# Patient Record
Sex: MALE | Race: WHITE
[De-identification: names, ages, dates, MRNs, and addresses within clinical notes are randomized per-mention and may not be internally consistent; named-entity substitution may affect disease eponyms.]

---

## 2020-10-15 ENCOUNTER — HOSPITAL ENCOUNTER (INPATIENT)
Dept: HOSPITAL 96 - M.ERS | Age: 80
LOS: 2 days | Discharge: HOME | DRG: 372 | End: 2020-10-17
Attending: INTERNAL MEDICINE | Admitting: INTERNAL MEDICINE
Payer: MEDICARE

## 2020-10-15 VITALS — DIASTOLIC BLOOD PRESSURE: 71 MMHG | SYSTOLIC BLOOD PRESSURE: 140 MMHG

## 2020-10-15 VITALS — WEIGHT: 173.9 LBS | BODY MASS INDEX: 25.76 KG/M2 | HEIGHT: 69.02 IN

## 2020-10-15 VITALS — SYSTOLIC BLOOD PRESSURE: 136 MMHG | DIASTOLIC BLOOD PRESSURE: 66 MMHG

## 2020-10-15 VITALS — DIASTOLIC BLOOD PRESSURE: 77 MMHG | SYSTOLIC BLOOD PRESSURE: 162 MMHG

## 2020-10-15 VITALS — DIASTOLIC BLOOD PRESSURE: 65 MMHG | SYSTOLIC BLOOD PRESSURE: 136 MMHG

## 2020-10-15 DIAGNOSIS — Z87.891: ICD-10-CM

## 2020-10-15 DIAGNOSIS — Z91.048: ICD-10-CM

## 2020-10-15 DIAGNOSIS — M19.90: ICD-10-CM

## 2020-10-15 DIAGNOSIS — Z79.82: ICD-10-CM

## 2020-10-15 DIAGNOSIS — Z23: ICD-10-CM

## 2020-10-15 DIAGNOSIS — D72.829: ICD-10-CM

## 2020-10-15 DIAGNOSIS — K63.89: ICD-10-CM

## 2020-10-15 DIAGNOSIS — K92.1: ICD-10-CM

## 2020-10-15 DIAGNOSIS — Z90.49: ICD-10-CM

## 2020-10-15 DIAGNOSIS — Z20.828: ICD-10-CM

## 2020-10-15 DIAGNOSIS — A04.72: Primary | ICD-10-CM

## 2020-10-15 DIAGNOSIS — Z79.899: ICD-10-CM

## 2020-10-15 LAB
ABSOLUTE BASOPHILS: 0.2 THOU/UL (ref 0–0.2)
ABSOLUTE EOSINOPHILS: 0.1 THOU/UL (ref 0–0.7)
ABSOLUTE MONOCYTES: 0.8 THOU/UL (ref 0–1.2)
ALBUMIN SERPL-MCNC: 3.9 G/DL (ref 3.4–5)
ALP SERPL-CCNC: 65 U/L (ref 46–116)
ALT SERPL-CCNC: 46 U/L (ref 30–65)
ANION GAP SERPL CALC-SCNC: 7 MMOL/L (ref 7–16)
AST SERPL-CCNC: 19 U/L (ref 15–37)
BASOPHILS NFR BLD AUTO: 1.3 %
BILIRUB SERPL-MCNC: 0.8 MG/DL
BUN SERPL-MCNC: 17 MG/DL (ref 7–18)
CALCIUM SERPL-MCNC: 8.8 MG/DL (ref 8.5–10.1)
CHLORIDE SERPL-SCNC: 102 MMOL/L (ref 98–107)
CO2 SERPL-SCNC: 29 MMOL/L (ref 21–32)
CREAT SERPL-MCNC: 1.1 MG/DL (ref 0.6–1.3)
EOSINOPHIL NFR BLD: 0.6 %
GLUCOSE SERPL-MCNC: 109 MG/DL (ref 70–99)
GRANULOCYTES NFR BLD MANUAL: 80 %
HCT VFR BLD CALC: 46.1 % (ref 42–52)
HGB BLD-MCNC: 15.2 GM/DL (ref 14–18)
LIPASE: 85 U/L (ref 73–393)
LYMPHOCYTES # BLD: 1.9 THOU/UL (ref 0.8–5.3)
LYMPHOCYTES NFR BLD AUTO: 12.7 %
MCH RBC QN AUTO: 30 PG (ref 26–34)
MCHC RBC AUTO-ENTMCNC: 32.9 G/DL (ref 28–37)
MCV RBC: 91.2 FL (ref 80–100)
MONOCYTES NFR BLD: 5.4 %
MPV: 6.8 FL. (ref 7.2–11.1)
NEUTROPHILS # BLD: 11.7 THOU/UL (ref 1.6–8.1)
NUCLEATED RBCS: 0 /100WBC
PLATELET COUNT*: 297 THOU/UL (ref 150–400)
POTASSIUM SERPL-SCNC: 3.4 MMOL/L (ref 3.5–5.1)
PROT SERPL-MCNC: 7.2 G/DL (ref 6.4–8.2)
RBC # BLD AUTO: 5.06 MIL/UL (ref 4.5–6)
RDW-CV: 14.1 % (ref 10.5–14.5)
SODIUM SERPL-SCNC: 138 MMOL/L (ref 136–145)
WBC # BLD AUTO: 14.6 THOU/UL (ref 4–11)

## 2020-10-15 NOTE — EKG
[place] [address]
Phone:  [phone]                     ELECTROCARDIOGRAM REPORT      
_______________________________________________________________________________
 
Name:         LAUREN POLO                Room:          [place] [address]   ADM [place] 
.R.#:    [id number]     Account #:     [id number]  
Admission:    10/15/20    Attend Phys:   Gus Becerra, 
Discharge:                Date of Birth: [birth year]  
Date of Service: 10/15/20 0937  Report #:      [id number]
        [id number]
_______________________________________________________________________________
THIS REPORT FOR:  //name//                      
 
                         [facility] ED
                                       
Test Date:    2020-10-15               Test Time:    09:37:52
Pat Name:     LAUREN POLO             Department:   
Patient ID:   [id number]            Room:         [facility]
Gender:       M                        Technician:   CAN
:          1940               Requested By: Alcides Mendez
Order Number: [id number] MD:   Derek Mijares
                                 Measurements
Intervals                              Axis          
Rate:         69                       P:            9
MD:           155                      QRS:          -27
QRSD:         97                       T:            38
QT:           395                                    
QTc:          423                                    
                           Interpretive Statements
Sinus rhythm
Borderline left axis deviation
Borderline low voltage, extremity leads
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
Baseline wander in lead(s) V1,V2
Compared to ECG 2017 12:25:19
Myocardial infarct finding now present
Electronically Signed On 10- 15:49:47 CDT by Derek Mijares
[email or web address]
 
 
 
 
 
 
 
 
 
 
 
 
 
 
 
 
 
  <ELECTRONICALLY SIGNED>
                                           By: Derek Mijares MD, FACC   
  10/15/20     1549
D: 10/15/20 0937   _____________________________________
T: 10/15/20 0937   Derek Mijares MD, FACC     /EPI

## 2020-10-16 VITALS — SYSTOLIC BLOOD PRESSURE: 122 MMHG | DIASTOLIC BLOOD PRESSURE: 58 MMHG

## 2020-10-16 VITALS — DIASTOLIC BLOOD PRESSURE: 51 MMHG | SYSTOLIC BLOOD PRESSURE: 147 MMHG

## 2020-10-16 VITALS — SYSTOLIC BLOOD PRESSURE: 132 MMHG | DIASTOLIC BLOOD PRESSURE: 69 MMHG

## 2020-10-16 LAB
ABSOLUTE BASOPHILS: 0.1 THOU/UL (ref 0–0.2)
ABSOLUTE EOSINOPHILS: 0.2 THOU/UL (ref 0–0.7)
ABSOLUTE MONOCYTES: 1.1 THOU/UL (ref 0–1.2)
ANION GAP SERPL CALC-SCNC: 6 MMOL/L (ref 7–16)
BASOPHILS NFR BLD AUTO: 0.5 %
BUN SERPL-MCNC: 18 MG/DL (ref 7–18)
CALCIUM SERPL-MCNC: 8.4 MG/DL (ref 8.5–10.1)
CHLORIDE SERPL-SCNC: 108 MMOL/L (ref 98–107)
CO2 SERPL-SCNC: 28 MMOL/L (ref 21–32)
CREAT SERPL-MCNC: 1 MG/DL (ref 0.6–1.3)
EOSINOPHIL NFR BLD: 1.7 %
GLUCOSE SERPL-MCNC: 92 MG/DL (ref 70–99)
GRANULOCYTES NFR BLD MANUAL: 70.9 %
HCT VFR BLD CALC: 40 % (ref 42–52)
HGB BLD-MCNC: 13.1 GM/DL (ref 14–18)
LYMPHOCYTES # BLD: 2.4 THOU/UL (ref 0.8–5.3)
LYMPHOCYTES NFR BLD AUTO: 18.5 %
MCH RBC QN AUTO: 29.8 PG (ref 26–34)
MCHC RBC AUTO-ENTMCNC: 32.7 G/DL (ref 28–37)
MCV RBC: 91 FL (ref 80–100)
MONOCYTES NFR BLD: 8.4 %
MPV: 7.3 FL. (ref 7.2–11.1)
NEUTROPHILS # BLD: 9.3 THOU/UL (ref 1.6–8.1)
NUCLEATED RBCS: 0 /100WBC
PLATELET COUNT*: 274 THOU/UL (ref 150–400)
POTASSIUM SERPL-SCNC: 3.9 MMOL/L (ref 3.5–5.1)
RBC # BLD AUTO: 4.39 MIL/UL (ref 4.5–6)
RDW-CV: 14.3 % (ref 10.5–14.5)
SODIUM SERPL-SCNC: 142 MMOL/L (ref 136–145)
WBC # BLD AUTO: 13.1 THOU/UL (ref 4–11)

## 2020-10-17 VITALS — DIASTOLIC BLOOD PRESSURE: 66 MMHG | SYSTOLIC BLOOD PRESSURE: 154 MMHG

## 2020-10-17 VITALS — SYSTOLIC BLOOD PRESSURE: 154 MMHG | DIASTOLIC BLOOD PRESSURE: 66 MMHG

## 2020-10-17 LAB
ABSOLUTE BASOPHILS: 0.1 THOU/UL (ref 0–0.2)
ABSOLUTE EOSINOPHILS: 0.3 THOU/UL (ref 0–0.7)
ABSOLUTE MONOCYTES: 0.8 THOU/UL (ref 0–1.2)
ALBUMIN SERPL-MCNC: 3.1 G/DL (ref 3.4–5)
ALP SERPL-CCNC: 42 U/L (ref 46–116)
ALT SERPL-CCNC: 26 U/L (ref 30–65)
ANION GAP SERPL CALC-SCNC: 6 MMOL/L (ref 7–16)
AST SERPL-CCNC: 13 U/L (ref 15–37)
BASOPHILS NFR BLD AUTO: 0.6 %
BILIRUB SERPL-MCNC: 0.7 MG/DL
BUN SERPL-MCNC: 14 MG/DL (ref 7–18)
CALCIUM SERPL-MCNC: 8.6 MG/DL (ref 8.5–10.1)
CHLORIDE SERPL-SCNC: 108 MMOL/L (ref 98–107)
CO2 SERPL-SCNC: 27 MMOL/L (ref 21–32)
CREAT SERPL-MCNC: 1 MG/DL (ref 0.6–1.3)
EOSINOPHIL NFR BLD: 2.6 %
GLUCOSE SERPL-MCNC: 77 MG/DL (ref 70–99)
GRANULOCYTES NFR BLD MANUAL: 67.3 %
HCT VFR BLD CALC: 38.5 % (ref 42–52)
HGB BLD-MCNC: 12.8 GM/DL (ref 14–18)
LYMPHOCYTES # BLD: 2.3 THOU/UL (ref 0.8–5.3)
LYMPHOCYTES NFR BLD AUTO: 21.9 %
MCH RBC QN AUTO: 30.2 PG (ref 26–34)
MCHC RBC AUTO-ENTMCNC: 33.2 G/DL (ref 28–37)
MCV RBC: 91.1 FL (ref 80–100)
MONOCYTES NFR BLD: 7.6 %
MPV: 7.3 FL. (ref 7.2–11.1)
NEUTROPHILS # BLD: 7.1 THOU/UL (ref 1.6–8.1)
NUCLEATED RBCS: 0 /100WBC
PLATELET COUNT*: 262 THOU/UL (ref 150–400)
POTASSIUM SERPL-SCNC: 3.8 MMOL/L (ref 3.5–5.1)
PROT SERPL-MCNC: 6 G/DL (ref 6.4–8.2)
RBC # BLD AUTO: 4.23 MIL/UL (ref 4.5–6)
RDW-CV: 14.1 % (ref 10.5–14.5)
SODIUM SERPL-SCNC: 141 MMOL/L (ref 136–145)
WBC # BLD AUTO: 10.6 THOU/UL (ref 4–11)

## 2020-10-19 NOTE — CON
[address]  [address]                    CONSULTATION                  
_______________________________________________________________________________
 
Name:       CAROL POLONIKOLAI GRACIA                 Room:           [address] [place]  
M.R.#:  [id number]      Account #:      [id number]  
Admission:  10/15/20     Attend Phys:    Gus Becerra MD 
Discharge:  10/17/20     Date of Birth:  [birth year]  
         Report #: [id number]
                                                                     [id number]  
_______________________________________________________________________________
THIS REPORT FOR:  //name//                      
 
cc:  Johnny Partida Vincent DO ~
THIS REPORT FOR:  //name//                      
 
DATE OF SERVICE:  10/16/2020
 
 
HISTORY OF PRESENT ILLNESS:  This is a pleasant 80-year-old gentleman who is
presenting to the hospital with hematochezia.  The patient reports having bright
red blood, 3-4 times since last night.  The patient has been taking prednisone
for arthritis.  Denies any NSAID use.  The patient told to take an oral course
of amoxicillin for ear infection.  The patient additionally reports abdominal
pain and cramps.  Denies prior episodes in the past like this.  Denies any
hematemesis or weight loss.
 
PAST MEDICAL HISTORY:  The patient has a past history significant for
osteoarthritis and BPH.
 
PAST SURGICAL HISTORY:  The patient has a history of appendectomy in the remote
past.
 
FAMILY HISTORY:  No family history of colon cancer or Sharma related neoplasia.
 
SOCIAL HISTORY:  The patient has remote history of smoking.  Denies alcohol or
recreational drug use.
 
REVIEW OF SYSTEMS:  Comprehensive 10-point review of systems is negative except
for what was mentioned in the HPI.
 
PHYSICAL EXAMINATION:
VITAL SIGNS:  Temperature 36.7, pulse rate 64, respirations 17, blood pressure
132/69.
GENERAL:  The patient is alert, awake, oriented x 3.
HEENT:  Pupils are equal, round, reactive to light and accommodation.  Mucous
membranes are moist.  There is no congestion.
LUNGS:  Clear to auscultation bilaterally.
CARDIOVASCULAR:  Rate and rhythm regular, S1, S2 present.
ABDOMEN:  Soft.  There is no distention, guarding, or rigidity.
EXTREMITIES:  Warm, well perfused.  There is no edema.
SKIN:  Warm and dry.
 
LABORATORY DATA:  Hemoglobin 12.8, hematocrit 38.5, platelet count 262, WBC
count 10.6.  Sodium 141, potassium 3.8, chloride 108, bicarbonate 27, BUN 14,
creatinine 1, total bilirubin 0.7, AST 13, ALT 26, alkaline phosphatase 42.
 
 
 
[place] [address]                    CONSULTATION                  
_______________________________________________________________________________
 
Name:       LAUREN POLO                 Room:           [address] [place]  
.R.#:  [id number]      Account #:      [id number]  
Admission:  10/15/20     Attend Phys:    Gus Becerra MD 
Discharge:  10/17/20     Date of Birth:  [birth year]  
         Report #: [id number]
                                                                     [id number]  
_______________________________________________________________________________
 
IMAGING:  Abdomen and pelvis, extensive bowel wall thickening of descending
colon, may represent infectious or inflammatory colitis.  Colon mass cannot be
completely excluded.  Colonoscopy can be obtained upon resolution of symptoms.
 
C. diff PCR positive.
 
ASSESSMENT AND PLAN:  Pleasant 80-year-old gentleman who is on prednisone and
amoxicillin, presenting with hematochezia.  The patient was noted to have
colitis on CT scan, but his last stool test came back positive for C. diff.  I
would recommend C. diff treatment with oral metronidazole 500 mg p.o. t.i.d. for
14 days.  We will follow up in the GI clinic to assess him after resolution of
symptoms.
 
 
 
 
 
 
 
 
 
 
 
 
 
 
 
 
 
 
 
 
 
 
 
 
 
 
 
 
 
 
 
<ELECTRONICALLY SIGNED>
                                        By:  William Avina MD         
10/19/20     0951
D: 10/17/20 1103_______________________________________
T: 10/17/20 1117William Avina MD            /nt